# Patient Record
Sex: MALE | Race: WHITE | NOT HISPANIC OR LATINO | Employment: OTHER | URBAN - METROPOLITAN AREA
[De-identification: names, ages, dates, MRNs, and addresses within clinical notes are randomized per-mention and may not be internally consistent; named-entity substitution may affect disease eponyms.]

---

## 2024-04-17 ENCOUNTER — OFFICE VISIT (OUTPATIENT)
Dept: UROLOGY | Facility: CLINIC | Age: 79
End: 2024-04-17
Payer: MEDICARE

## 2024-04-17 DIAGNOSIS — C61 PROSTATE CANCER: ICD-10-CM

## 2024-04-17 DIAGNOSIS — N52.9 ERECTILE DYSFUNCTION, UNSPECIFIED ERECTILE DYSFUNCTION TYPE: Primary | ICD-10-CM

## 2024-04-17 PROCEDURE — 99203 OFFICE O/P NEW LOW 30 MIN: CPT | Mod: S$GLB,,, | Performed by: NURSE PRACTITIONER

## 2024-04-17 NOTE — PROGRESS NOTES
"Subjective:       Patient ID: Kevyn Presley is a 78 y.o. male.    Chief Complaint: No chief complaint on file.      HPI: 78-year-old male, new to [facility], presents with complaint of erectile dysfunction.    Patient has history of prostate cancer with a radical prostatectomy around the year 2000.  Patient had radical prostatectomy at [facility].    Patient states since he has had his radical prostatectomy he has been having ED issues.    He is currently using a vacuum pump and "rubber bands".  States he is failed PDE5 inhibitors.    No urinary complaints at this time.         Past Medical History:   Past Medical History:   Diagnosis Date    HTN (hypertension)     Hypogonadism in male     Hypothyroidism, unspecified     Prostate cancer        Past Surgical Historical:   Past Surgical History:   Procedure Laterality Date    RADICAL PROSTATECTOMY  2000        Medications:      Past Social History:   Social History     Socioeconomic History    Marital status:        Allergies: Review of patient's allergies indicates:  No Known Allergies     Family History: No family history on file.     Review of Systems:  Review of Systems   Constitutional:  Negative for activity change and appetite change.   HENT:  Negative for congestion and dental problem.    Respiratory:  Negative for chest tightness and shortness of breath.    Cardiovascular:  Negative for chest pain.   Gastrointestinal:  Negative for abdominal distention and abdominal pain.   Genitourinary:  Negative for decreased urine volume, difficulty urinating, dysuria, enuresis, flank pain, frequency, genital sores, hematuria, penile discharge, penile pain, penile swelling, scrotal swelling, testicular pain and urgency.   Musculoskeletal:  Negative for back pain and neck pain.   Neurological:  Negative for dizziness.   Hematological:  Negative for adenopathy.   Psychiatric/Behavioral:  Negative for agitation, behavioral problems and confusion.  "       Physical Exam:  Physical Exam  Vitals and nursing note reviewed.   Constitutional:       Appearance: He is well-developed.   HENT:      Head: Normocephalic.   Cardiovascular:      Rate and Rhythm: Normal rate and regular rhythm.      Heart sounds: Normal heart sounds.   Pulmonary:      Effort: Pulmonary effort is normal.      Breath sounds: Normal breath sounds.   Abdominal:      General: Bowel sounds are normal.      Palpations: Abdomen is soft.   Skin:     General: Skin is warm and dry.   Neurological:      Mental Status: He is alert and oriented to person, place, and time.         Assessment/Plan:   Erectile dysfunction:  Patient has tried both Viagra and Cialis in the past with no improvement.    States when he needs an erection he is unable to get an erection.  He has used a vacuum pump and bands to get an erection and maintain an erection.    We discussed injectables.  Patient will try TriMix 30/1/20.  He will start with 15 units and titrate up in 5 unit increments if needed.    Did discuss the risk of priapism.  Patient stated understanding.  Prescription for Brethine included.  Patient will notify us for any complaints or complications.    Patient follow-up in 4-6 weeks for re-evaluation, sooner if needed.  Problem List Items Addressed This Visit          Oncology    Prostate cancer    Overview     Radical prostatectomy @2000          Other Visit Diagnoses       Erectile dysfunction, unspecified erectile dysfunction type    -  Primary

## 2024-06-27 ENCOUNTER — OFFICE VISIT (OUTPATIENT)
Dept: UROLOGY | Facility: CLINIC | Age: 79
End: 2024-06-27
Payer: MEDICARE

## 2024-06-27 VITALS
DIASTOLIC BLOOD PRESSURE: 74 MMHG | HEART RATE: 74 BPM | SYSTOLIC BLOOD PRESSURE: 128 MMHG | BODY MASS INDEX: 29.55 KG/M2 | WEIGHT: 195 LBS | HEIGHT: 68 IN

## 2024-06-27 DIAGNOSIS — N52.9 ERECTILE DYSFUNCTION, UNSPECIFIED ERECTILE DYSFUNCTION TYPE: Primary | ICD-10-CM

## 2024-06-27 PROCEDURE — 99213 OFFICE O/P EST LOW 20 MIN: CPT | Mod: S$GLB,,, | Performed by: NURSE PRACTITIONER

## 2024-06-27 RX ORDER — TERBUTALINE SULFATE 2.5 MG/1
TABLET ORAL
COMMUNITY
Start: 2024-05-03

## 2024-06-27 RX ORDER — TADALAFIL 20 MG/1
TABLET ORAL
COMMUNITY
Start: 2024-05-11

## 2024-06-27 RX ORDER — LISINOPRIL AND HYDROCHLOROTHIAZIDE 12.5; 2 MG/1; MG/1
TABLET ORAL
COMMUNITY
Start: 2024-02-27

## 2024-06-27 RX ORDER — LEVOTHYROXINE, LIOTHYRONINE 76; 18 UG/1; UG/1
TABLET ORAL
COMMUNITY
Start: 2024-01-30

## 2024-06-27 NOTE — PROGRESS NOTES
Subjective:       Patient ID: Kevyn Presley is a 79 y.o. male.    Chief Complaint: Erectile Dysfunction      HPI: 79-year-old male, presents for 6 week follow-up.    Had presented erectile dysfunction.  He would failed PDE5 inhibitors.    We started the patient on TriMix 30/1/20.  Patient patient was working well.  He is currently using 10 units.  States he gets good erections.  Erections will last long for completion.    Denies any pain with erections.  Denies any episodes of priapism.  No other urinary complaints at this time.         Past Medical History:   Past Medical History:   Diagnosis Date    HTN (hypertension)     Hypogonadism in male     Hypothyroidism, unspecified     Prostate cancer        Past Surgical Historical:   Past Surgical History:   Procedure Laterality Date    RADICAL PROSTATECTOMY  2000        Medications:   Medication List with Changes/Refills   Current Medications    LISINOPRIL-HYDROCHLOROTHIAZIDE (PRINZIDE,ZESTORETIC) 20-12.5 MG PER TABLET        NP THYROID 120 MG TAB        TADALAFIL (CIALIS) 20 MG TAB        TERBUTALINE (BRETHINE) 2.5 MG TAB            Past Social History:   Social History     Socioeconomic History    Marital status:    Tobacco Use    Smoking status: Never     Passive exposure: Never    Smokeless tobacco: Never   Substance and Sexual Activity    Alcohol use: Never    Drug use: Never    Sexual activity: Yes       Allergies: Review of patient's allergies indicates:  No Known Allergies     Family History: No family history on file.     Review of Systems:  Review of Systems   Constitutional:  Negative for activity change and appetite change.   HENT:  Negative for congestion and dental problem.    Respiratory:  Negative for chest tightness and shortness of breath.    Cardiovascular:  Negative for chest pain.   Gastrointestinal:  Negative for abdominal distention and abdominal pain.   Genitourinary:  Negative for decreased urine volume, difficulty urinating, dysuria,  enuresis, flank pain, frequency, genital sores, hematuria, penile discharge, penile pain, penile swelling, scrotal swelling, testicular pain and urgency.   Musculoskeletal:  Negative for back pain and neck pain.   Neurological:  Negative for dizziness.   Hematological:  Negative for adenopathy.   Psychiatric/Behavioral:  Negative for agitation, behavioral problems and confusion.        Physical Exam:  Physical Exam  Vitals and nursing note reviewed.   Constitutional:       Appearance: He is well-developed.   HENT:      Head: Normocephalic.   Cardiovascular:      Rate and Rhythm: Normal rate and regular rhythm.      Heart sounds: Normal heart sounds.   Pulmonary:      Effort: Pulmonary effort is normal.      Breath sounds: Normal breath sounds.   Abdominal:      General: Bowel sounds are normal.      Palpations: Abdomen is soft.   Skin:     General: Skin is warm and dry.   Neurological:      Mental Status: He is alert and oriented to person, place, and time.         Assessment/Plan:   Erectile dysfunction: Patient is doing well on TriMix 30/1/20.  Patient will continue as directed.  Patient provided refills.      Follow-up 1 year, sooner if needed.  Problem List Items Addressed This Visit    None  Visit Diagnoses       Erectile dysfunction, unspecified erectile dysfunction type    -  Primary

## 2024-07-29 ENCOUNTER — TELEPHONE (OUTPATIENT)
Dept: UROLOGY | Facility: CLINIC | Age: 79
End: 2024-07-29
Payer: MEDICARE

## 2024-08-09 RX ORDER — TERBUTALINE SULFATE 2.5 MG/1
2.5 TABLET ORAL
Qty: 30 TABLET | Refills: 11 | Status: SHIPPED | OUTPATIENT
Start: 2024-08-09

## 2024-11-04 RX ORDER — TERBUTALINE SULFATE 2.5 MG/1
5 TABLET ORAL
Qty: 60 TABLET | Refills: 3 | Status: SHIPPED | OUTPATIENT
Start: 2024-11-04 | End: 2025-11-04

## 2025-01-23 ENCOUNTER — TELEPHONE (OUTPATIENT)
Dept: UROLOGY | Facility: CLINIC | Age: 80
End: 2025-01-23
Payer: MEDICARE

## 2025-01-23 NOTE — TELEPHONE ENCOUNTER
Patient was called and he has received his medication.       Patient call was returned and he states he need a refill on Trimix and that he is out of refills. Patient states pharmacy faxed over form. Patient was informed that we will look for fax and have it sent.   ----- Message from Kylie sent at 1/23/2025  8:05 AM CST -----  Type:  RX Refill Request    Who Called:   Patient  Refill or New Rx:  refill  RX Name and Strength:  NON FORMULARY MEDICATION  How is the patient currently taking it? (ex. 1XDay):    Is this a 30 day or 90 day RX:   Preferred Pharmacy with phone number:      [id number] - [place], [place] - [address] AT [facility] [address] [facility] [address]  [address]  [place] [address]  Phone: [phone] Fax: [phone]    [facility] - [place], [place] - [address]  [address] [facility] [address]  Phone: [phone] Fax: [phone]      Local or Mail Order: [facility]   Ordering Provider: Phil Rivera  Would the patient rather a call back or a response via [facility]?  Call back  Best Call Back Number:  [phone]    Additional Information:

## 2025-02-04 ENCOUNTER — TELEPHONE (OUTPATIENT)
Dept: UROLOGY | Facility: CLINIC | Age: 80
End: 2025-02-04
Payer: MEDICARE

## 2025-02-04 DIAGNOSIS — N52.9 ERECTILE DYSFUNCTION, UNSPECIFIED ERECTILE DYSFUNCTION TYPE: Primary | ICD-10-CM

## 2025-02-04 RX ORDER — PAPAVERINE HYDROCHLORIDE 30 MG/ML
INJECTION INTRAMUSCULAR; INTRAVENOUS
Qty: 10 ML | Refills: 5 | Status: SHIPPED | OUTPATIENT
Start: 2025-02-04

## 2025-02-04 NOTE — TELEPHONE ENCOUNTER
Patient call was returned and he stated he need to have an increase on medication Trimix. Patient states he does not know the strength he is on now. Pharmacy was called and strength was received and order was made for an increase. Patient was notified of increase on medication. Patient verbalized understanding.     ----- Message from Lemko sent at 2/4/2025  8:25 AM CST -----  Contact: ANGELY PAGE [[id number]]  ..Type:  Patient Requesting Call    Who Called:ANGELY PAGE [[id number]]  Does the patient know what this is regarding?: called to raise the strength of his injection   Would the patient rather a call back or a response via [facility]? call  Best Call Back Number:[phone] (home)     Additional Information:       [facility] - [place] - [address] [facility]  [address] [facility] [address]  Phone: [phone] Fax: [phone]

## 2025-02-19 ENCOUNTER — TELEPHONE (OUTPATIENT)
Dept: UROLOGY | Facility: CLINIC | Age: 80
End: 2025-02-19
Payer: MEDICARE

## 2025-02-19 RX ORDER — TERBUTALINE SULFATE 2.5 MG/1
5 TABLET ORAL
Qty: 90 TABLET | Refills: 3 | Status: SHIPPED | OUTPATIENT
Start: 2025-02-19 | End: 2026-02-19

## 2025-02-19 RX ORDER — TADALAFIL 20 MG/1
20 TABLET ORAL DAILY
Status: CANCELLED | OUTPATIENT
Start: 2025-02-19

## 2025-02-19 NOTE — TELEPHONE ENCOUNTER
Patient call was returned and he stated he didn't need a refill on Cialis but he needed one for brethine. Patient states he wanted it sent to [facility]. Patient was informed that it will be sent.     ----- Message from Jessica sent at 2/19/2025  2:49 PM CST -----  Contact: self  Type:  RX Refill RequestWho Called: Kevyn OlsoneRefill or New Rx:refillRX Name and Strength:tadalafiL (CIALIS) 20 MG TabHow is the patient currently taking it? (ex. 1XDay):dailyIs this a 30 day or 90 day RX:90Preferred Pharmacy with phone number:[facility][id number] - [place] - [address] AT [facility] [id number] [place] [address]Fsyxv: [phone] Fax: [phone] or Mail Order:localOrdering Provider:Placido the patient rather a call back or a response via [facility]? [facility] Call Back Number:[phone] Information: n/a

## 2025-02-19 NOTE — TELEPHONE ENCOUNTER
Patient call was returned and multiple times there was a busy signal. Patient spouse was called and no answer. Message was left for patient to return call and this was a return call from him. Message stated we close at 5 and if the message comes in late that we will call back on tomorrow.     ----- Message from Nurse Parekh sent at 2/18/2025  4:55 PM CST -----  Contact: ANGELY PAGE [[id number]]    ----- Message -----  From: Hellen Cabral  Sent: 2/18/2025   4:25 PM CST  To: Miguel Villarreal Staff    .Type:  Patient Requesting CallWho Called:ANGELY PAGE [[id number]]Does the patient know what this is regarding?:meds refill, pt do not know the name of meds Would the patient rather a call back or a response via [facility]? callBe Call Back Number:.[phone] (home) Additional Information: .[facility] #[id number] - [place], [place] - [address] AT [facility] [id number] [place] [address]Gcxip: [phone] Fax: [phone]

## 2025-02-20 ENCOUNTER — TELEPHONE (OUTPATIENT)
Dept: UROLOGY | Facility: CLINIC | Age: 80
End: 2025-02-20
Payer: MEDICARE

## 2025-02-20 NOTE — TELEPHONE ENCOUNTER
Patient call was returned and he stated it was taken care of.     ----- Message from Jessica sent at 2/20/2025  2:41 PM CST -----  Contact: haley  Type:  Patient Returning CallWho Called:Bobo Left Message for Patient:Jesus Manuel the patient know what this is regarding?:correct dosage-max allowed per dayWould the patient rather a call back or a response via [facility]? cbBest Call Back Number:terbutaline (BRETHINE) 2.5 MG [id number] Information: n/a

## 2025-02-21 ENCOUNTER — TELEPHONE (OUTPATIENT)
Dept: UROLOGY | Facility: CLINIC | Age: 80
End: 2025-02-21
Payer: MEDICARE

## 2025-02-21 NOTE — TELEPHONE ENCOUNTER
Pharmacy was called to give clarification on medication brethine. They were informed patient can take as needed if erection lasts over 2 hours, wait 15 mins and if no result then take 2 more tablets by mouth.

## 2025-02-21 NOTE — TELEPHONE ENCOUNTER
Patient call was returned and he stated that the pharmacy needed more information and that the script was missing something. Patient was informed that we will contact the pharmacy to get it fixed.     ----- Message from Key sent at 2/21/2025  8:14 AM CST -----  Contact: ANGELY PAGE [[id number]]  ..Type:  Patient Requesting CallWho Called:ANGELY PAGE [[id number]]Would the patient rather a call back or a response via [facility]? [facility] Call Back Number:.[phone] (home) Additional Information: Patient states that pharm is not able to fill medication..[facility] #[id number] - [place], [place] - [address] AT [facility] [id number] [place] [address]Ivsnc: [phone] Fax: [phone]

## 2025-02-27 RX ORDER — TERBUTALINE SULFATE 2.5 MG/1
5 TABLET ORAL
Qty: 90 TABLET | Refills: 3 | Status: SHIPPED | OUTPATIENT
Start: 2025-02-27 | End: 2026-02-27

## 2025-06-23 ENCOUNTER — TELEPHONE (OUTPATIENT)
Dept: UROLOGY | Facility: CLINIC | Age: 80
End: 2025-06-23
Payer: MEDICARE

## 2025-06-23 NOTE — TELEPHONE ENCOUNTER
Patient call was returned and no answer. Message was left sating we will have the refill done at his follow up appointment on Friday June 27th.    Copied from CRM #[id number]. Topic: Medications - Medication Refill  >> Jun 23, 2025 10:47 AM Sara wrote:  ..TYPE: Patient Requesting Refill     Who Called:  [facility]  Refill or New Rx: refill  RX Name and Strength: terbutaline (BRETHINE) 2.5 MG Tab  How is the patient currently taking it? (ex. 1XDay):   Is this a 30 day or 90 day RX: 30  Preferred Pharmacy with phone number:   [facility] #[id number] - [place], [place] - [address] AT SEC OF [facility] [address]  [address]  [place] [address]  Phone: [phone] Fax: [phone]  Local or Mail Order: local  Ordering Provider: higinio  Would the patient rather a call back or a response via [facility]?  call  Best Call Back Number: [phone]  Additional Information:

## 2025-06-27 ENCOUNTER — OFFICE VISIT (OUTPATIENT)
Dept: UROLOGY | Facility: CLINIC | Age: 80
End: 2025-06-27
Payer: MEDICARE

## 2025-06-27 ENCOUNTER — TELEPHONE (OUTPATIENT)
Dept: UROLOGY | Facility: CLINIC | Age: 80
End: 2025-06-27

## 2025-06-27 VITALS
HEIGHT: 68 IN | WEIGHT: 200 LBS | RESPIRATION RATE: 18 BRPM | DIASTOLIC BLOOD PRESSURE: 78 MMHG | HEART RATE: 56 BPM | BODY MASS INDEX: 30.31 KG/M2 | OXYGEN SATURATION: 98 % | SYSTOLIC BLOOD PRESSURE: 132 MMHG

## 2025-06-27 DIAGNOSIS — N52.9 ERECTILE DYSFUNCTION, UNSPECIFIED ERECTILE DYSFUNCTION TYPE: ICD-10-CM

## 2025-06-27 DIAGNOSIS — C61 PROSTATE CANCER: Primary | ICD-10-CM

## 2025-06-27 RX ORDER — VALSARTAN 80 MG/1
80 TABLET ORAL
COMMUNITY

## 2025-06-27 RX ORDER — TERBUTALINE SULFATE 2.5 MG/1
5 TABLET ORAL
Qty: 90 TABLET | Refills: 3 | Status: SHIPPED | OUTPATIENT
Start: 2025-06-27 | End: 2026-06-27

## 2025-06-27 RX ORDER — PANTOPRAZOLE SODIUM 40 MG/1
TABLET, DELAYED RELEASE ORAL
COMMUNITY

## 2025-06-27 RX ORDER — APIXABAN 5 MG/1
5 TABLET, FILM COATED ORAL 2 TIMES DAILY
COMMUNITY

## 2025-06-27 RX ORDER — ATORVASTATIN CALCIUM 80 MG/1
80 TABLET, FILM COATED ORAL NIGHTLY
COMMUNITY
Start: 2025-04-05

## 2025-06-27 RX ORDER — LEVOTHYROXINE SODIUM 50 UG/1
TABLET ORAL
COMMUNITY
Start: 2025-04-16

## 2025-06-27 NOTE — TELEPHONE ENCOUNTER
[facility] pharmacy was called and Rikki the pharmacist was given a verbal order on TriMix for refill for the 30/2/20 for a year supply.

## 2025-06-27 NOTE — PROGRESS NOTES
Subjective:       Patient ID: Kevyn Presley is a 80 y.o. male.    Chief Complaint: Follow-up      HPI: 72-year-old male, established patient, presents for yearly visit.    Patient has a history of erectile dysfunction secondary to radical prostatectomy.    We had the patient on TriMix 30/1/20.  Patient states medication is working well with no complaints.  States he is using 25 units.    Patient does have Brethine on hand.    Patient has history of prostate cancer with a radical prostatectomy in 2020.    PSAs are monitored by his primary care.    No other urinary complaints at this time.    Since last visit patient has had a cardiac procedure.  Patient was diagnosed with AFib and a ST-elevation myocardial infarction.  Patient had stent placement.    Patient is recovering well.         Past Medical History:   Past Medical History:   Diagnosis Date    HTN (hypertension)     Hypogonadism in male     Hypothyroidism, unspecified     Male erectile dysfunction, unspecified     Prostate cancer        Past Surgical Historical:   Past Surgical History:   Procedure Laterality Date    RADICAL PROSTATECTOMY  2000        Medications:   Medication List with Changes/Refills   Current Medications    ATORVASTATIN (LIPITOR) 80 MG TABLET    Take 80 mg by mouth every evening.    ELIQUIS 5 MG TAB    Take 5 mg by mouth 2 (two) times daily.    LEVOTHYROXINE (SYNTHROID) 50 MCG TABLET    TAKE 1 TABLET BY MOUTH EVERY MORNING AT LEAST 2 HOURS BEFORE FOOD OR DRINKS    LISINOPRIL-HYDROCHLOROTHIAZIDE (PRINZIDE,ZESTORETIC) 20-12.5 MG PER TABLET        NON FORMULARY MEDICATION    Inject 10 Units as directed as needed. Tri Mix 30/1/20    NP THYROID 120 MG TAB        PANTOPRAZOLE (PROTONIX) 40 MG TABLET    TAKE 1 TABLET BY MOUTH EVERY MORNING AND 1 TABLET EVERY EVENING BEFORE MEALS    PAPAVERINE 30 MG/ML INJECTION    Inject 0.3 ml of trimix solution prn ED max once daily  Prepare 10ml of trimix solution containing:    Papaverine 30mg/ml     "Phentolamine 1 mg/ml    Alprostadil 10mcg/ml  Dispense 10ml per refill  Qs syringes 1cc/30g/0.5" and alcohol swabs dispense as needed for Intracavernosal injection    TADALAFIL (CIALIS) 20 MG TAB        VALSARTAN (DIOVAN) 80 MG TABLET    Take 80 mg by mouth.   Changed and/or Refilled Medications    Modified Medication Previous Medication    TERBUTALINE (BRETHINE) 2.5 MG TAB terbutaline (BRETHINE) 2.5 MG Tab       Take 2 tablets (5 mg total) by mouth as needed (after 2 hours take 2 tabs, if no improvement after 15 minutes take 2 tabs.  After 30 minutes if no improvement seek medical treatment.).    Take 2 tablets (5 mg total) by mouth as needed (after 2 hours take 2 tabs, if no improvement after 15 minutes take 2 tabs.  After 30 minutes if no improvement seek medical treatment.).        Past Social History: Social History[1]    Allergies: Review of patient's allergies indicates:  No Known Allergies     Family History: No family history on file.     Review of Systems:  Review of Systems   Constitutional:  Negative for activity change and appetite change.   HENT:  Negative for congestion and dental problem.    Respiratory:  Negative for chest tightness and shortness of breath.    Cardiovascular:  Negative for chest pain.   Gastrointestinal:  Negative for abdominal distention and abdominal pain.   Genitourinary:  Negative for decreased urine volume, difficulty urinating, dysuria, enuresis, flank pain, frequency, genital sores, hematuria, penile discharge, penile pain, penile swelling, scrotal swelling, testicular pain and urgency.   Musculoskeletal:  Negative for back pain and neck pain.   Neurological:  Negative for dizziness.   Hematological:  Negative for adenopathy.   Psychiatric/Behavioral:  Negative for agitation, behavioral problems and confusion.        Physical Exam:  Physical Exam  Vitals and nursing note reviewed.   Constitutional:       Appearance: He is well-developed.   HENT:      Head: Normocephalic. "   Cardiovascular:      Rate and Rhythm: Normal rate and regular rhythm.      Heart sounds: Normal heart sounds.   Pulmonary:      Effort: Pulmonary effort is normal.      Breath sounds: Normal breath sounds.   Abdominal:      General: Bowel sounds are normal.      Palpations: Abdomen is soft.   Skin:     General: Skin is warm and dry.   Neurological:      Mental Status: He is alert and oriented to person, place, and time.         Assessment/Plan:   1. Erectile dysfunction:  Patient does have success with TriMix 30/1/20.  He is using about 25 units at a time.  Will increase to 30/2/20 to try to decrease the units he injects.  We did discuss quad Mix.  However, we will hold off on Flomax until TriMix is no longer effective.  Brethine sent to his [facility] pharmacy.  TriMix called out to [facility] pharmacy.      2. Prostate cancer:  Patient denies any unexpected weight loss.  Denies new onset bone pain.    PSAs are monitored by primary care.      Follow-up 1 year, sooner if needed  Problem List Items Addressed This Visit       Prostate cancer - Primary    Overview   Radical prostatectomy @2000          Other Visit Diagnoses         Erectile dysfunction, unspecified erectile dysfunction type        Relevant Medications    terbutaline (BRETHINE) 2.5 MG Tab                      [1]   Social History  Socioeconomic History    Marital status:    Tobacco Use    Smoking status: Never     Passive exposure: Never    Smokeless tobacco: Never   Substance and Sexual Activity    Alcohol use: Never    Drug use: Never    Sexual activity: Yes     Social Drivers of Health     Financial Resource Strain: Low Risk  (4/3/2025)    Received from [facility]    Overall Financial Resource Strain (CARDIA)     Difficulty of Paying Living Expenses: Not hard at all   Food Insecurity: No Food Insecurity (4/3/2025)    Received from [facility]    Hunger Vital Sign     Worried About Running Out of Food in the Last Year: Never true      Ran Out of Food in the Last Year: Never true   Transportation Needs: No Transportation Needs (4/3/2025)    [facility]    [facility]     In the past 12 months, has lack of transportation kept you from medical appointments or from getting medications?: No   Housing Stability: Low Risk  (4/3/2025)    Received from [facility]    Housing Stability Vital Sign     At any time in the past 12 months, were you homeless or living in a shelter (including now)?: No